# Patient Record
Sex: FEMALE | Race: ASIAN | URBAN - METROPOLITAN AREA
[De-identification: names, ages, dates, MRNs, and addresses within clinical notes are randomized per-mention and may not be internally consistent; named-entity substitution may affect disease eponyms.]

---

## 2018-11-09 ENCOUNTER — OFFICE VISIT (OUTPATIENT)
Dept: FAMILY MEDICINE CLINIC | Facility: CLINIC | Age: 33
End: 2018-11-09

## 2018-11-09 ENCOUNTER — LAB ENCOUNTER (OUTPATIENT)
Dept: LAB | Age: 33
End: 2018-11-09
Attending: FAMILY MEDICINE
Payer: COMMERCIAL

## 2018-11-09 VITALS
DIASTOLIC BLOOD PRESSURE: 69 MMHG | SYSTOLIC BLOOD PRESSURE: 109 MMHG | BODY MASS INDEX: 20.57 KG/M2 | WEIGHT: 128 LBS | HEART RATE: 71 BPM | TEMPERATURE: 98 F | HEIGHT: 66 IN

## 2018-11-09 DIAGNOSIS — Z00.00 ANNUAL PHYSICAL EXAM: ICD-10-CM

## 2018-11-09 DIAGNOSIS — Z00.00 ANNUAL PHYSICAL EXAM: Primary | ICD-10-CM

## 2018-11-09 PROCEDURE — 80053 COMPREHEN METABOLIC PANEL: CPT

## 2018-11-09 PROCEDURE — 80061 LIPID PANEL: CPT

## 2018-11-09 PROCEDURE — 85025 COMPLETE CBC W/AUTO DIFF WBC: CPT

## 2018-11-09 PROCEDURE — 99385 PREV VISIT NEW AGE 18-39: CPT | Performed by: FAMILY MEDICINE

## 2018-11-09 PROCEDURE — 84443 ASSAY THYROID STIM HORMONE: CPT

## 2018-11-09 PROCEDURE — 36415 COLL VENOUS BLD VENIPUNCTURE: CPT

## 2018-11-09 RX ORDER — BECLOMETHASONE DIPROPIONATE 80 UG/1
2 AEROSOL, METERED NASAL DAILY
Qty: 1 INHALER | Refills: 10 | Status: SHIPPED | OUTPATIENT
Start: 2018-11-09

## 2018-11-09 RX ORDER — TRAZODONE HYDROCHLORIDE 50 MG/1
50 TABLET ORAL DAILY
Qty: 30 TABLET | Refills: 2 | Status: SHIPPED | OUTPATIENT
Start: 2018-11-09

## 2018-11-09 RX ORDER — MONTELUKAST SODIUM 10 MG/1
10 TABLET ORAL NIGHTLY
Refills: 0 | COMMUNITY
Start: 2018-10-03 | End: 2018-11-09

## 2018-11-09 RX ORDER — BECLOMETHASONE DIPROPIONATE HFA 80 UG/1
1 AEROSOL, METERED RESPIRATORY (INHALATION) 2 TIMES DAILY
Qty: 1 INHALER | Refills: 3 | Status: SHIPPED | OUTPATIENT
Start: 2018-11-09

## 2018-11-09 RX ORDER — MONTELUKAST SODIUM 10 MG/1
10 TABLET ORAL NIGHTLY
Qty: 90 TABLET | Refills: 1 | Status: SHIPPED | OUTPATIENT
Start: 2018-11-09

## 2018-11-09 RX ORDER — BECLOMETHASONE DIPROPIONATE HFA 80 UG/1
AEROSOL, METERED RESPIRATORY (INHALATION)
Refills: 9 | COMMUNITY
Start: 2018-10-06 | End: 2018-11-09

## 2018-11-09 RX ORDER — BECLOMETHASONE DIPROPIONATE 80 UG/1
AEROSOL, METERED NASAL
Refills: 10 | COMMUNITY
Start: 2018-10-15 | End: 2018-11-09

## 2018-11-09 RX ORDER — TRAZODONE HYDROCHLORIDE 50 MG/1
50 TABLET ORAL DAILY
Refills: 2 | COMMUNITY
Start: 2018-10-03 | End: 2018-11-09

## 2018-11-09 NOTE — PROGRESS NOTES
HPI:    Dimitry Trujillo is a 28year old female presents to clinic as new patient to establish care. Has seasonal allergies and asthma. Needs refills of meds. No other concerns. Normal appetite, balanced diet. Normal BMs and urination.  Normal sleep ha well-developed, well-nourished, and in no distress. HENT:   Head: Normocephalic and atraumatic.    Right Ear: Tympanic membrane, external ear and ear canal normal.   Left Ear: Tympanic membrane, external ear and ear canal normal.   Nose: Nose normal.   Mo Breath Activated 1 Inhaler 3     Sig: Inhale 1 puff into the lungs 2 (two) times daily. • TraZODone HCl 50 MG Oral Tab 30 tablet 2     Sig: Take 1 tablet (50 mg total) by mouth daily.        Imaging & Referrals:  None       11/9/2018  Eliseo Mueller MD

## 2018-11-16 ENCOUNTER — OFFICE VISIT (OUTPATIENT)
Dept: FAMILY MEDICINE CLINIC | Facility: CLINIC | Age: 33
End: 2018-11-16

## 2018-11-16 VITALS
WEIGHT: 129.81 LBS | DIASTOLIC BLOOD PRESSURE: 75 MMHG | TEMPERATURE: 98 F | SYSTOLIC BLOOD PRESSURE: 112 MMHG | HEIGHT: 66 IN | BODY MASS INDEX: 20.86 KG/M2 | HEART RATE: 84 BPM

## 2018-11-16 DIAGNOSIS — G47.25 JET LAG: Primary | ICD-10-CM

## 2018-11-16 PROCEDURE — 99212 OFFICE O/P EST SF 10 MIN: CPT | Performed by: FAMILY MEDICINE

## 2018-11-16 PROCEDURE — 99213 OFFICE O/P EST LOW 20 MIN: CPT | Performed by: FAMILY MEDICINE

## 2018-11-16 RX ORDER — PROMETHAZINE HYDROCHLORIDE 12.5 MG/1
12.5 TABLET ORAL EVERY 6 HOURS PRN
Qty: 30 TABLET | Refills: 0 | Status: SHIPPED | OUTPATIENT
Start: 2018-11-16 | End: 2019-06-24

## 2018-11-16 RX ORDER — ZOLPIDEM TARTRATE 5 MG/1
5 TABLET ORAL NIGHTLY PRN
Qty: 30 TABLET | Refills: 0 | Status: SHIPPED | OUTPATIENT
Start: 2018-11-16 | End: 2019-06-24

## 2018-11-16 NOTE — PROGRESS NOTES
HPI:    Natalya Booker is a 28year old female presents to clinic to discuss travel meds. Is travelling to [facility] next week. Would like something to help with her jet lag and nausea on the plan.  Has taken Trazodone for sleep in the past which helps relaxes distress. Neurological: She is alert. Gait normal.   Psychiatric: Affect normal.   Vitals reviewed. ASSESSMENT/PLAN:   Jet lag  (primary encounter diagnosis)  - Zolpidem rx to pharmacy. Side effects discussed.  Patient advised not to drive a vehicle, d

## 2019-01-18 ENCOUNTER — LAB REQUISITION (OUTPATIENT)
Dept: LAB | Facility: HOSPITAL | Age: 34
End: 2019-01-18
Payer: COMMERCIAL

## 2019-01-18 DIAGNOSIS — O09.611 SUPERVISION OF YOUNG PRIMIGRAVIDA IN FIRST TRIMESTER: ICD-10-CM

## 2019-01-18 PROCEDURE — 87086 URINE CULTURE/COLONY COUNT: CPT | Performed by: NURSE PRACTITIONER

## 2019-01-19 ENCOUNTER — HOSPITAL ENCOUNTER (OUTPATIENT)
Dept: ULTRASOUND IMAGING | Age: 34
Discharge: HOME | End: 2019-01-19
Attending: OBSTETRICS & GYNECOLOGY
Payer: COMMERCIAL

## 2019-01-19 DIAGNOSIS — O09.611 SUPERVISION OF YOUNG PRIMIGRAVIDA IN FIRST TRIMESTER: ICD-10-CM

## 2019-01-19 PROCEDURE — 76801 OB US < 14 WKS SINGLE FETUS: CPT | Performed by: OBSTETRICS & GYNECOLOGY

## 2019-01-19 PROCEDURE — 76817 TRANSVAGINAL US OBSTETRIC: CPT | Performed by: OBSTETRICS & GYNECOLOGY

## 2019-06-24 RX ORDER — ZOLPIDEM TARTRATE 5 MG/1
5 TABLET ORAL NIGHTLY PRN
Qty: 90 TABLET | Refills: 1 | OUTPATIENT
Start: 2019-06-24

## 2019-06-24 NOTE — TELEPHONE ENCOUNTER
Controlled medications pending for review. If approved needs to be called in or faxed by on site staff.     Last Rx: 11-16-18 # 30  LOV: 11-16-18    Requested Prescriptions     Pending Prescriptions Disp Refills   • Zolpidem Tartrate 5 MG Oral Tab 30 tablet

## 2019-06-24 NOTE — TELEPHONE ENCOUNTER
[facility] needs a refill of:      Zolpidem Tartrate 5 MG Oral Tab Take 1 tablet (5 mg total) by mouth nightly as needed for Sleep.  Disp: 30 tablet Rfl: 0       Per Gloria Diaz also needs a refill of Polymyxin-B/Trimethoprimophth Sol Instill 1 drop b

## 2019-06-25 RX ORDER — PROMETHAZINE HYDROCHLORIDE 12.5 MG/1
TABLET ORAL
Qty: 30 TABLET | Refills: 0 | Status: SHIPPED | OUTPATIENT
Start: 2019-06-25

## 2019-06-25 NOTE — TELEPHONE ENCOUNTER
Passed protocol but is promethazine an \"allergy medication? \"  Or GI (for nausea).   Please advise on rx request.